# Patient Record
Sex: MALE | Race: WHITE | NOT HISPANIC OR LATINO | Employment: FULL TIME | URBAN - NONMETROPOLITAN AREA
[De-identification: names, ages, dates, MRNs, and addresses within clinical notes are randomized per-mention and may not be internally consistent; named-entity substitution may affect disease eponyms.]

---

## 2018-05-01 ENCOUNTER — OFFICE VISIT CONVERTED (OUTPATIENT)
Dept: FAMILY MEDICINE CLINIC | Age: 23
End: 2018-05-01
Attending: NURSE PRACTITIONER

## 2018-06-13 ENCOUNTER — OFFICE VISIT CONVERTED (OUTPATIENT)
Dept: FAMILY MEDICINE CLINIC | Age: 23
End: 2018-06-13
Attending: NURSE PRACTITIONER

## 2019-02-25 ENCOUNTER — HOSPITAL ENCOUNTER (OUTPATIENT)
Dept: OTHER | Facility: HOSPITAL | Age: 24
Discharge: HOME | End: 2019-02-25
Attending: NURSE PRACTITIONER

## 2019-02-25 ENCOUNTER — OFFICE VISIT CONVERTED (OUTPATIENT)
Dept: FAMILY MEDICINE CLINIC | Age: 24
End: 2019-02-25
Attending: NURSE PRACTITIONER

## 2019-02-25 LAB
ALBUMIN SERPL-MCNC: 4.6 G/DL (ref 3.5–5)
ALBUMIN/GLOB SERPL: 1.7 {RATIO} (ref 1.4–2.6)
ALP SERPL-CCNC: 79 U/L (ref 53–128)
ALT SERPL-CCNC: 20 U/L (ref 10–40)
ANION GAP SERPL CALC-SCNC: 16 MMOL/L (ref 8–19)
AST SERPL-CCNC: 19 U/L (ref 15–50)
BILIRUB SERPL-MCNC: 0.43 MG/DL (ref 0.2–1.3)
BUN SERPL-MCNC: 11 MG/DL (ref 5–25)
BUN/CREAT SERPL: 12 {RATIO} (ref 6–20)
CALCIUM SERPL-MCNC: 9.4 MG/DL (ref 8.7–10.4)
CHLORIDE SERPL-SCNC: 104 MMOL/L (ref 99–111)
CONV CO2: 26 MMOL/L (ref 22–32)
CONV TOTAL PROTEIN: 7.3 G/DL (ref 6.3–8.2)
CREAT UR-MCNC: 0.89 MG/DL (ref 0.7–1.2)
ERYTHROCYTE [DISTWIDTH] IN BLOOD BY AUTOMATED COUNT: 11.7 % (ref 11.5–14.5)
GFR SERPLBLD BASED ON 1.73 SQ M-ARVRAT: >60 ML/MIN/{1.73_M2}
GLOBULIN UR ELPH-MCNC: 2.7 G/DL (ref 2–3.5)
GLUCOSE SERPL-MCNC: 95 MG/DL (ref 70–99)
HBA1C MFR BLD: 15.8 G/DL (ref 14–18)
HCT VFR BLD AUTO: 44.9 % (ref 42–52)
MCH RBC QN AUTO: 28.6 PG (ref 27–31)
MCHC RBC AUTO-ENTMCNC: 35.2 G/DL (ref 33–37)
MCV RBC AUTO: 81.2 FL (ref 80–96)
OSMOLALITY SERPL CALC.SUM OF ELEC: 293 MOSM/KG (ref 273–304)
PLATELET # BLD AUTO: 273 10*3/UL (ref 130–400)
PMV BLD AUTO: 9 FL (ref 7.4–10.4)
POTASSIUM SERPL-SCNC: 4.1 MMOL/L (ref 3.5–5.3)
RBC # BLD AUTO: 5.53 10*6/UL (ref 4.7–6.1)
SODIUM SERPL-SCNC: 142 MMOL/L (ref 135–147)
T4 FREE SERPL-MCNC: 1 NG/DL (ref 0.9–1.8)
TSH SERPL-ACNC: 5.18 M[IU]/L (ref 0.27–4.2)
VIT B12 SERPL-MCNC: 346 PG/ML (ref 211–911)
WBC # BLD AUTO: 6.9 10*3/UL (ref 4.8–10.8)

## 2019-02-26 LAB — 25(OH)D3 SERPL-MCNC: 20.9 NG/ML (ref 30–100)

## 2019-02-28 LAB — CONV ANTI MICROSOMAL AB: 7 IU/ML (ref 0–34)

## 2021-05-18 NOTE — PROGRESS NOTES
Rod Garcia 1995     Office/Outpatient Visit    Visit Date: Mon, Feb 25, 2019 02:55 pm    Provider: Lilian Brown N.P. (Assistant: Ava Gusman LPN)    Location: [facility]        Electronically signed by Lilian Brown N.P. on  02/25/2019 03:36:08 PM                             SUBJECTIVE:        CC:     Mr. Garcia is a 24 year old White male.  Preventative exam         HPI:         Health checkup noted.  He does not perform regular testicular self-exams.  He is current with his Td.  He is not current with influenza immunization.      Smoking Status:  Nonsmoker         PHQ-9 Depression Screening: Completed form scanned and in chart; Total Score 12 Alcohol Consumption Screening: Completed form scanned and in chart; Total Score 0         Dx with headache; onset was approximately 2 months ago.  The pain is diffuse with no specific location.  It does not radiate.  He characterizes it as throbbing.  Associated symptoms include runny nose.  He denies altered consciousness, fever or vision disturbance.  The headache is exacerbated with stress.  It is improved with NSAIDs.  In the process of buying a new house. New job started 2 months ago. Going back to old job next month. Working dayshift.      ROS:     CONSTITUTIONAL:  Positive for fatigue.   Negative for fever.      EYES:  Negative for blurred vision and eye drainage.      E/N/T:  Positive for runny nose.   Negative for ear pain or sore throat.      CARDIOVASCULAR:  Negative for chest pain and palpitations.      RESPIRATORY:  Negative for dyspnea and frequent wheezing.      GASTROINTESTINAL:  Negative for abdominal pain and vomiting.      MUSCULOSKELETAL:  Negative for joint stiffness and myalgias.      INTEGUMENTARY:  Negative for rash.      NEUROLOGICAL:  Positive for headaches.   Negative for dizziness.      PSYCHIATRIC:  Positive for feelings of stress.   Negative for depression or suicidal thoughts.          PMH/FMH/SH:     Last Reviewed on  2/25/2019 03:09 PM by Lilian Brown    Past Medical History:             PAST MEDICAL HISTORY         Asthma: dx'd at age 3; moderate severity;     ADHD (early childhood) per pt         Surgical History:     NONE         Family History:         Positive for Coronary Artery Disease ( paternal side ) and Hypertension ( paternal side ).      Positive for Asthma ( brother ).      Positive for Type 2 Diabetes ( paternal side ).  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  Non-drinker         Substance Abuse History:     Last Reviewed on 2/25/2019 03:09 PM by Lilian Brown    None         Mental Health History:     Last Reviewed on 2/25/2019 03:09 PM by Lilian Brown    NEGATIVE         Communicable Diseases (eg STDs):     Last Reviewed on 2/25/2019 03:09 PM by Lilian Brown    Reportable health conditions; NEGATIVE             Current Problems:     Last Reviewed on 2/25/2019 03:09 PM by Lilian Brown    Asthma     Acne     Screening for depression         Immunizations:     Adacel (Tdap) 1/31/2013         Allergies:     Last Reviewed on 2/25/2019 03:09 PM by Lilian Brown      No Known Drug Allergies.         Current Medications:     Last Reviewed on 2/25/2019 03:09 PM by Lilian Brown    Ibuprofen 200mg Capsules 1-2 PRN     Tylenol Caplets     Zyrtec 10mg Tablet 1 tab daily         OBJECTIVE:        Vitals:         Historical:     06/13/2018  BP:   119/81 mm Hg ( (left arm, , sitting, );)         Current: 2/25/2019 3:00:57 PM    Ht:  5 ft, 10.5 in;  Wt: 227.8 lbs;  BMI: 32.2    T: 98.4 F (oral);  BP: 135/78 mm Hg (left arm, sitting);  P: 82 bpm (left arm (BP Cuff), sitting)        Exams:     PHYSICAL EXAM:     GENERAL: well developed,  well nourished;  no apparent distress;     EYES: PERRL, EOMI     E/N/T: EARS: external auditory canal normal;  bilateral TMs are normal;  NOSE: normal turbinates; no sinus tenderness; OROPHARYNX: oral mucosa is normal; posterior pharynx shows no exudate;     NECK: range of motion is normal; trachea is midline;     RESPIRATORY: normal respiratory rate and pattern with no distress; normal breath sounds with no rales, rhonchi, wheezes or rubs;     CARDIOVASCULAR: normal rate; rhythm is regular;     GASTROINTESTINAL: nontender; normal bowel sounds; no organomegaly;     SKIN:  no significant rashes or lesions; no suspicious moles;     MUSCULOSKELETAL: normal gait; normal range of motion of all major muscle groups; no limb or joint pain with range of motion;     NEUROLOGIC: mental status: alert and oriented x 3; GROSSLY INTACT     PSYCHIATRIC: appropriate affect and demeanor; normal psychomotor function; normal speech pattern; normal thought and perception;         ASSESSMENT           V70.0   Z00.00  Health checkup              DDx:     V79.0   Z13.89  Screening for depression              DDx:     493.00   J45.20  Asthma              DDx:     784.0   R51  Headache              DDx:     780.79   R53.83  Fatigue              DDx:         ORDERS:         Meds Prescribed:       ProAir HFA (Albuterol) 90mcg/1actuation Oral Inhaler Inhale 2 puff(s) by mouth q 4 to 6 hr prn.  #16 (Sixteen) gm Refills: 1         Lab Orders:       58440  BDCB2 - [facility] CBC w/o diff  (Send-Out)         00923  COMP - [facility] Comp. Metabolic Panel  (Send-Out)         49735  TSH - [facility] TSH  (Send-Out)         03741  VB12 - [facility] Vitamin B12  (Send-Out)         79208  VITD - [facility] Vitamin D, 25 Hydroxy  (Send-Out)           Other Orders:         Calculated BMI above the upper parameter and a follow-up plan was documented in the medical record  (In-House)           Depression screen negative  (In-House)           Negative EtOH screen  (In-House)                    PLAN:          Health checkup     LABORATORY:  Labs ordered to be performed today include CBC W/O DIFF, Comprehensive metabolic panel, and TSH.      RECOMMENDATIONS given include: Further recommendation to be given after test results are complete.  [facility] Vaccines Flu and Pneumonia updated in Shot record     BMI Elevated - Follow-Up Plan: He was provided education on weight loss strategies     FOLLOW-UP: Schedule follow-up appointments on a p.r.n. basis. for Annual Checkup     COUNSELING was provided today regarding the following topics: healthy eating habits, regular exercise, testicular self-exam, alcohol, and ADVISED TO SEE AN EYE DOCTOR AND A DENTIST REGULARLY.            Orders:       07566  BDCB2 - [facility] CBC w/o diff  (Send-Out)         39152  COMP - [facility] Comp. Metabolic Panel  (Send-Out)         26396  TSH - [facility] TSH  (Send-Out)           Calculated BMI above the upper parameter and a follow-up plan was documented in the medical record  (In-House)             Patient Education Handouts:       Physical Exam 20-29 year, Male           Screening for depression     [facility] PHQ-9 Depression Screening Completed form scanned and in chart; Total Score 12 Negative alcohol screen           Orders:         Depression screen negative  (In-House)           Negative EtOH screen  (In-House)            Asthma Will refill Albuterol inhaler in the event that he needs. Currently stable.           Prescriptions:       ProAir HFA (Albuterol) 90mcg/1actuation Oral Inhaler Inhale 2 puff(s) by mouth q 4 to 6 hr prn.  #16 (Sixteen) gm Refills: 1          Headache Discussed with patient that I feel this is likely tension/stress headache. Advised Ibuprofen/Tylenol per package instructions. Sleep hygiene. Headache diary.          Fatigue     LABORATORY:  Labs ordered to be performed today include B12 and Vitamin D.      RECOMMENDATIONS given include: Further recommendation to be given after test results are  complete.            Orders:       48845  VB12 - [facility] Vitamin B12  (Send-Out)         29923  VITD - [facility] Vitamin D, 25 Hydroxy  (Send-Out)               Patient Recommendations:        For  Health checkup:     Limit dietary intake of fat (especially saturated fat) and cholesterol.  Eat a variety of foods, including plenty of fruits, vegetables, and grain containg fiber, limit fat intake to 30% of total calories. Balance caloric intake with energy expended. Maintaining regular physical activity is advised to help prevent heart disease, hypertension, diabetes, and obesity.    Testicular cancer is the #1 cancer in men ages 15-35.  You should regularly examine your testicles for knots, lumps, or tenderness. Testicular pain, even if not associated with any masses, needs to be evaluated by your doctor!  Schedule follow-up appointments as needed.              CHARGE CAPTURE           **Please note: ICD descriptions below are intended for billing purposes only and may not represent clinical diagnoses**        Primary Diagnosis:         V70.0 Health checkup            Z00.00    Encounter for general adult medical examination without abnormal findings              Orders:          92711   Preventive medicine, established patient, age 18-39 years  (In-House)                Calculated BMI above the upper parameter and a follow-up plan was documented in the medical record  (In-House)           V79.0 Screening for depression            Z13.89    Encounter for screening for other disorder              Orders:             Depression screen negative  (In-House)                Negative EtOH screen  (In-House)           493.00 Asthma            J45.20    Mild intermittent asthma, uncomplicated    784.0 Headache            R51    Headache    780.79 Fatigue            R53.83    Other fatigue

## 2021-05-18 NOTE — PROGRESS NOTES
Rod Garcia 1995     Office/Outpatient Visit    Visit Date: Wed, Jun 13, 2018 11:42 am    Provider: Regina Santos N.P. (Assistant: Katy Brothers RN)    Location: [facility]        Electronically signed by Regina Santos N.P. on  06/13/2018 12:44:49 PM                             SUBJECTIVE:        CC:     Mr. Garcia is a 23 year old White male.  Rash to right arm         HPI:         Patient complains of rash.  This has been a problem for the past month.  The rash has not occurred previously.  It is located primarily on the right arm.  He describes the rash as red.  The rash is moderately pruritic.      ROS:     CONSTITUTIONAL:  Negative for fever.      CARDIOVASCULAR:  Negative for chest pain, palpitations, tachycardia, orthopnea, and edema.      RESPIRATORY:  Negative for cough, dyspnea, and hemoptysis.      NEUROLOGICAL:  Negative for dizziness, headaches, paresthesias, and weakness.          PMH/FM/SH:     Last Reviewed on 6/13/2018 11:47 AM by Regina Santos    Past Medical History:             PAST MEDICAL HISTORY         Asthma: dx'd at age 3; moderate severity;         Surgical History:     NONE         Family History:         Positive for Coronary Artery Disease ( paternal side ) and Hypertension ( paternal side ).      Positive for Asthma ( brother ).      Positive for Type 2 Diabetes ( paternal side ).  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  Non-drinker             Immunizations:     Adacel (Tdap) 1/31/2013         Allergies:     Last Reviewed on 6/13/2018 11:44 AM by Katy Brothers      No Known Drug Allergies.         Current Medications:     Last Reviewed on 6/13/2018  11:44 AM by Katy Brothers    Ibuprofen 200mg Capsules 1-2 PRN     Tylenol Caplets     Zyrtec 10mg Tablet 1 tab daily         OBJECTIVE:        Vitals:         Current: 6/13/2018 11:43:50 AM    Ht:  5 ft, 10.5 in;  Wt: 231.2 lbs;  BMI: 32.7    T: 98.3 F (oral);  BP: 119/81 mm Hg (left arm, sitting);  P: 64 bpm (left arm (BP Cuff), sitting)        Exams:     PHYSICAL EXAM:     GENERAL:  well developed and nourished; appropriately groomed; in no apparent distress;     RESPIRATORY: normal respiratory rate and pattern with no distress; normal breath sounds with no rales, rhonchi, wheezes or rubs;     CARDIOVASCULAR: normal rate; rhythm is regular;  no systolic murmur;     SKIN: a rash is noted on the right wrist;  the color is mainly pink and red;  it is best characterized as 3-4 round erythematous lesions with central clearing on right forearm;     PSYCHIATRIC:  appropriate affect and demeanor; normal speech pattern; grossly normal memory;         ASSESSMENT           782.1   R21  Rash              DDx:         ORDERS:         Meds Prescribed:       Lamisil AT (Terbinafine HCl) 1% Cream Apply small amount to affected area bid  #24 (Twenty Four) gm Refills: 1                 PLAN:          Rash discussed fungal rashes, keep skin dry, consider derm referral         FOLLOW-UP: Advised to call if there is no improvement in 2 week(s).            Prescriptions:       Lamisil AT (Terbinafine HCl) 1% Cream Apply small amount to affected area bid  #24 (Twenty Four) gm Refills: 1             CHARGE CAPTURE           **Please note: ICD descriptions below are intended for billing purposes only and may not represent clinical diagnoses**        Primary Diagnosis:         782.1 Rash            R21    Rash and other nonspecific skin eruption              Orders:          29401   Office/outpatient visit; established patient, level 3  (In-House)

## 2021-05-18 NOTE — PROGRESS NOTES
Rod Garcia 1995     Office/Outpatient Visit    Visit Date: Tue, May 1, 2018 03:39 pm    Provider: Rach Giordano N.P. (Assistant: Edilia Clayton)    Location: [facility]        Electronically signed by Rach Giordano N.P. on  05/01/2018 05:36:31 PM                             SUBJECTIVE:        CC:     Mr. Garcia is a 23 year old White male.  earache right ear         HPI:         Patient complains of ear pain.      Mr. Garcia complains of right ear pain.  Associated symptoms include diminished hearing and drainage from the right ear.  The symptoms began 2 days ago.  Pertinent medical history is unremarkable.  He has not tried any medications for symptomatic relief.      ROS:     CONSTITUTIONAL:  Negative for chills, fatigue, fever and weight change.      E/N/T:  Positive for ear pain ( right ).      CARDIOVASCULAR:  Negative for chest pain, orthopnea, paroxysmal nocturnal dyspnea and pedal edema.      RESPIRATORY:  Negative for dyspnea and cough.          PMH/[facility]/SH:     Last Reviewed on 5/01/2018 05:35 PM by Rach Giordano    Past Medical History:         Asthma: dx'd at age 3; moderate severity;         Surgical History:     NONE         Family History:         Positive for Coronary Artery Disease ( paternal side ) and Hypertension ( paternal side ).      Positive for Asthma ( brother ).      Positive for Type 2 Diabetes ( paternal side ).  Father: Hypertension     Mother: Hypertension     Brother(s): 3 brother(s) total;  Asthma         Social History:     Occupation: [employer]     Marital Status: Single     Children: None         Tobacco/Alcohol/Supplements:     Last Reviewed on 5/01/2018 05:35 PM by Rach Giordano    Tobacco: He has never smoked.  Non-drinker             Current Problems:     Last Reviewed on 5/01/2018 05:35 PM by Rach Giordano    Acne     Ear pain         Immunizations:     Adacel (Tdap) 1/31/2013         Allergies:     Last Reviewed on  5/01/2018 05:35 PM by Rach Giordano      No Known Drug Allergies.         Current Medications:     Last Reviewed on 5/01/2018 05:35 PM by Rach Giordano    Ibuprofen 200mg Capsules 1-2 PRN     Tylenol Caplets     Zyrtec 10mg Tablet 1 tab daily         OBJECTIVE:        Vitals:         Current: 5/1/2018 3:42:11 PM    Ht:  5 ft, 10.5 in;  Wt: 231.1 lbs;  BMI: 32.7    T: 98.7 F (oral);  BP: 147/89 mm Hg (left arm, sitting);  P: 88 bpm (left arm (BP Cuff), sitting)        Exams:     PHYSICAL EXAM:     GENERAL: Vitals recorded well developed, well nourished;  well groomed;  no apparent distress;     E/N/T: EARS: the right TM is dull and red;     RESPIRATORY: normal respiratory rate and pattern with no distress; normal breath sounds with no rales, rhonchi, wheezes or rubs;     CARDIOVASCULAR: normal rate; rhythm is regular;  normal S1; normal S2; no systolic murmur; no cyanosis; no edema;         ASSESSMENT:           388.70   H65.01  Ear pain              DDx:         ORDERS:         Meds Prescribed:       Amoxicillin 875mg Tablet One PO BID X 10 days.  #20 (Twenty) tablet(s) Refills: 0                 PLAN:          Ear pain         FOLLOW-UP: Advised to call if there is no improvement Follow-up by phone if no improvement in 1 week..   Schedule follow-up appointments on a p.r.n. basis..            Prescriptions:       Amoxicillin 875mg Tablet One PO BID X 10 days.  #20 (Twenty) tablet(s) Refills: 0           Patient Education Handouts:       Acute Ear Infection (Otitis Media)              Patient Recommendations:        For  Ear pain:     Follow-up by phone if no improvement in 1 week. Schedule follow-up appointments as needed.                APPOINTMENT INFORMATION:        Monday Tuesday Wednesday Thursday Friday Saturday Sunday            Time:___________________AM  PM   Date:_____________________             CHARGE CAPTURE:           Primary Diagnosis:     388.70 Ear pain            H65.01    Acute  serous otitis media, right ear              Orders:          97610   Office/outpatient visit; established patient, level 3  (In-House)

## 2021-07-01 VITALS
WEIGHT: 231.2 LBS | HEART RATE: 64 BPM | DIASTOLIC BLOOD PRESSURE: 81 MMHG | BODY MASS INDEX: 32.37 KG/M2 | HEIGHT: 71 IN | SYSTOLIC BLOOD PRESSURE: 119 MMHG | TEMPERATURE: 98.3 F

## 2021-07-01 VITALS
DIASTOLIC BLOOD PRESSURE: 89 MMHG | SYSTOLIC BLOOD PRESSURE: 147 MMHG | BODY MASS INDEX: 32.35 KG/M2 | TEMPERATURE: 98.7 F | HEART RATE: 88 BPM | HEIGHT: 71 IN | WEIGHT: 231.1 LBS

## 2021-07-01 VITALS
SYSTOLIC BLOOD PRESSURE: 135 MMHG | DIASTOLIC BLOOD PRESSURE: 78 MMHG | WEIGHT: 227.8 LBS | HEART RATE: 82 BPM | HEIGHT: 71 IN | BODY MASS INDEX: 31.89 KG/M2 | TEMPERATURE: 98.4 F

## 2022-08-03 ENCOUNTER — OFFICE VISIT (OUTPATIENT)
Dept: FAMILY MEDICINE CLINIC | Age: 27
End: 2022-08-03

## 2022-08-03 ENCOUNTER — LAB (OUTPATIENT)
Dept: LAB | Facility: HOSPITAL | Age: 27
End: 2022-08-03

## 2022-08-03 VITALS
HEART RATE: 70 BPM | WEIGHT: 210.8 LBS | BODY MASS INDEX: 29.51 KG/M2 | DIASTOLIC BLOOD PRESSURE: 85 MMHG | SYSTOLIC BLOOD PRESSURE: 126 MMHG | HEIGHT: 71 IN

## 2022-08-03 DIAGNOSIS — N52.8 OTHER MALE ERECTILE DYSFUNCTION: ICD-10-CM

## 2022-08-03 DIAGNOSIS — Z72.51 HIGH RISK HETEROSEXUAL BEHAVIOR: Primary | ICD-10-CM

## 2022-08-03 LAB
HAV IGM SERPL QL IA: NORMAL
HBV CORE IGM SERPL QL IA: NORMAL
HBV SURFACE AG SERPL QL IA: NORMAL
HCV AB SER DONR QL: NORMAL
HIV1+2 AB SER QL: NORMAL

## 2022-08-03 PROCEDURE — 99203 OFFICE O/P NEW LOW 30 MIN: CPT | Performed by: FAMILY MEDICINE

## 2022-08-03 PROCEDURE — 86592 SYPHILIS TEST NON-TREP QUAL: CPT | Performed by: FAMILY MEDICINE

## 2022-08-03 PROCEDURE — 36415 COLL VENOUS BLD VENIPUNCTURE: CPT | Performed by: FAMILY MEDICINE

## 2022-08-03 PROCEDURE — G0432 EIA HIV-1/HIV-2 SCREEN: HCPCS | Performed by: FAMILY MEDICINE

## 2022-08-03 PROCEDURE — 80074 ACUTE HEPATITIS PANEL: CPT | Performed by: FAMILY MEDICINE

## 2022-08-03 RX ORDER — SILDENAFIL CITRATE 20 MG/1
20 TABLET ORAL DAILY PRN
COMMUNITY
End: 2023-02-03

## 2022-08-03 NOTE — ASSESSMENT & PLAN NOTE
DISCUSSED THE DETAILS OF NORMAL ERECTILE FUNCTION AND PERFORMANCE ANXIETY ISSUES.  FOR NOW HE WILL CONTINUE THE PRN GENERIC VIAGRA BUT WOULD CONSIDER DIFFERENT MEDS, LAB EVAL AND POSSIBLE UROLOGY REFERRAL

## 2022-08-03 NOTE — PROGRESS NOTES
"Chief Complaint  Establish Care    Subjective          Rod Garcia presents to [facility]  NEW PATIENT:  --HE EXPERIENCED AN EPISODE OF E.C. FOUR MONTHS AGO AND SINCE THAT TIME HAS HAD CONTINUED ISSUES MAINTAINING AN ERECTION FOR SATISFACTORY INTERCOURSE.  HE GOT SOME GENERIC VIAGRA ON LINE WHICH SEEMS TO HELP BUT HE WANTED SOME MORE ADVICE.    --HE WOULD ALSO LIKE TO BE TESTED FOR STD'S ALTHOUGH HE IS NOT AWARE OF ANY PARTICULAR EXPOSURE AND IS HAVING NO SYMPTOMS.  HE STATES THAT HE ALWAYS USES A CONDOM         No Known Allergies     Health Maintenance Due   Topic Date Due   • ANNUAL PHYSICAL  Never done   • HEPATITIS C SCREENING  Never done        Current Outpatient Medications on File Prior to Visit   Medication Sig   • sildenafil (REVATIO) 20 MG tablet Take 20 mg by mouth Daily As Needed for Erectile Dysfunction. 1-2 AS NEEDED     No current facility-administered medications on file prior to visit.       Immunization History   Administered Date(s) Administered   • Tdap 01/31/2013       Review of Systems   Constitutional: Negative for activity change, appetite change, chills, fatigue and fever.   HENT: Negative for congestion, ear pain, rhinorrhea and sore throat.    Respiratory: Negative for cough and shortness of breath.    Cardiovascular: Negative for chest pain, palpitations and leg swelling.   Gastrointestinal: Negative for abdominal pain, constipation, diarrhea, nausea and vomiting.   Genitourinary: Negative for decreased libido, difficulty urinating, discharge, dysuria, frequency, genital sores, hematuria, penile pain and urgency.   Musculoskeletal: Negative for arthralgias and myalgias.   Neurological: Negative for headache.   Psychiatric/Behavioral: Positive for stress.        Objective     /85 (BP Location: Left arm, Patient Position: Sitting)   Pulse 70   Ht 179.1 cm (70.5\")   Wt 95.6 kg (210 lb 12.8 oz)   BMI 29.82 kg/m²       Physical Exam  Vitals and " nursing note reviewed.   Constitutional:       General: He is not in acute distress.     Appearance: Normal appearance.   Cardiovascular:      Rate and Rhythm: Normal rate and regular rhythm.      Heart sounds: Normal heart sounds. No murmur heard.  Pulmonary:      Effort: Pulmonary effort is normal.      Breath sounds: Normal breath sounds.   Abdominal:      Palpations: Abdomen is soft.      Tenderness: There is no abdominal tenderness.   Musculoskeletal:      Cervical back: Neck supple.      Right lower leg: No edema.      Left lower leg: No edema.   Lymphadenopathy:      Cervical: No cervical adenopathy.   Neurological:      General: No focal deficit present.      Mental Status: He is alert.      Cranial Nerves: No cranial nerve deficit.      Coordination: Coordination normal.      Gait: Gait normal.   Psychiatric:         Mood and Affect: Mood normal.         Behavior: Behavior normal.         Result Review :                             Assessment and Plan      Diagnoses and all orders for this visit:    1. High risk heterosexual behavior (Primary)  -     Chlamydia trachomatis, Neisseria gonorrhoeae, PCR - , Urine, Clean Catch  -     Hepatitis panel, acute  -     RPR  -     HIV-1/O/2 Ag/Ab w Reflex    2. Other male erectile dysfunction  Assessment & Plan:  DISCUSSED THE DETAILS OF NORMAL ERECTILE FUNCTION AND PERFORMANCE ANXIETY ISSUES.  FOR NOW HE WILL CONTINUE THE PRN GENERIC VIAGRA BUT WOULD CONSIDER DIFFERENT MEDS, LAB EVAL AND POSSIBLE UROLOGY REFERRAL              Follow Up     Return if symptoms worsen or fail to improve.    Patient was given instructions and counseling regarding his condition or for health maintenance advice. Please see specific information pulled into the AVS if appropriate.

## 2022-08-04 LAB — RPR SER QL: NORMAL

## 2022-08-23 ENCOUNTER — TELEPHONE (OUTPATIENT)
Dept: FAMILY MEDICINE CLINIC | Age: 27
End: 2022-08-23

## 2022-08-23 NOTE — TELEPHONE ENCOUNTER
----- Message from Hazel Granado LPN sent at 8/8/2022 10:32 AM EDT -----      ----- Message -----  From: SYSTEM  Sent: 8/8/2022   1:44 AM EDT  To: [facility] [place] [facility] [place]

## 2022-12-15 ENCOUNTER — TELEPHONE (OUTPATIENT)
Dept: FAMILY MEDICINE CLINIC | Age: 27
End: 2022-12-15

## 2022-12-15 NOTE — TELEPHONE ENCOUNTER
Caller: Rod Garcia    Relationship: Self    Best call back number: [phone]    What is the best time to reach you: ANYTIME    Who are you requesting to speak with (clinical staff, provider,  specific staff member): RENEE    What was the call regarding: PATIENT WOULD LIKE A CALL FROM RENEE TO DISCUSS SOMETHING PERSONAL.     Do you require a callback: YES

## 2022-12-19 NOTE — TELEPHONE ENCOUNTER
Pt said its personal and would not discuss. Offered two appts this month. He said no he can not make it, schedule him one after the first of the year. Appt scheduled for 02/03/23.

## 2023-02-03 ENCOUNTER — OFFICE VISIT (OUTPATIENT)
Dept: FAMILY MEDICINE CLINIC | Age: 28
End: 2023-02-03
Payer: COMMERCIAL

## 2023-02-03 VITALS
OXYGEN SATURATION: 98 % | DIASTOLIC BLOOD PRESSURE: 83 MMHG | SYSTOLIC BLOOD PRESSURE: 150 MMHG | TEMPERATURE: 98.1 F | BODY MASS INDEX: 29.6 KG/M2 | HEART RATE: 87 BPM | HEIGHT: 71 IN | WEIGHT: 211.4 LBS

## 2023-02-03 DIAGNOSIS — Z20.2 EXPOSURE TO GENITAL HERPES: Primary | ICD-10-CM

## 2023-02-03 PROCEDURE — 99213 OFFICE O/P EST LOW 20 MIN: CPT | Performed by: FAMILY MEDICINE

## 2023-02-03 NOTE — PROGRESS NOTES
"Chief Complaint  Exposure to STD (Pt states he would like lab orders to be tested for STD's. Pt is not having symptoms just wants to be checked. 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  HE IS USING CONDOMS AND HAS HAD NO SYMPTOMS AS WELL.  IS CONCERNED REGARDING WETHER OR NOT HE SHOULD HAVE LABS DONE.          No Known Allergies     Health Maintenance Due   Topic Date Due   • COVID-19 Vaccine (1) Never done   • INFLUENZA VACCINE  Never done   • ANNUAL PHYSICAL  Never done   • TDAP/TD VACCINES (2 - Td or Tdap) 01/31/2023        Current Outpatient Medications on File Prior to Visit   Medication Sig   • [DISCONTINUED] sildenafil (REVATIO) 20 MG tablet Take 20 mg by mouth Daily As Needed for Erectile Dysfunction. 1-2 AS NEEDED     No current facility-administered medications on file prior to visit.       Immunization History   Administered Date(s) Administered   • Tdap 01/31/2013       Review of Systems   Constitutional: Negative for activity change, appetite change, chills, fatigue and fever.   HENT: Negative for congestion, ear pain, rhinorrhea and sore throat.    Respiratory: Negative for cough and shortness of breath.    Cardiovascular: Negative for chest pain, palpitations and leg swelling.   Gastrointestinal: Negative for abdominal pain, constipation, diarrhea, nausea and vomiting.   Musculoskeletal: Negative for arthralgias and myalgias.   Neurological: Negative for headache.        Objective     /83 (BP Location: Left arm, Patient Position: Sitting, Cuff Size: Adult)   Pulse 87   Temp 98.1 °F (36.7 °C) (Oral)   Ht 179.1 cm (70.51\")   Wt 95.9 kg (211 lb 6.4 oz)   SpO2 98% Comment: room air  BMI 29.89 kg/m²       Physical Exam  Vitals and nursing note reviewed. "   Constitutional:       General: He is not in acute distress.     Appearance: Normal appearance.   Pulmonary:      Effort: Pulmonary effort is normal.   Musculoskeletal:      Right lower leg: No edema.      Left lower leg: No edema.   Neurological:      General: No focal deficit present.      Mental Status: He is alert.      Cranial Nerves: No cranial nerve deficit.      Coordination: Coordination normal.      Gait: Gait normal.   Psychiatric:         Mood and Affect: Mood normal.         Behavior: Behavior normal.         Result Review :                             Assessment and Plan      Diagnoses and all orders for this visit:    1. Exposure to genital herpes (Primary)  Comments:  DISCUSSED THIS VIRAL INFECTION.  AS HIS PARTNER WAS EXPOSED AND HAS HAD NOT BREAKOUTS HE PREFERS TO WAIT ON TESTING AT THIS TIME.  ADVISED TO CONTINUE CONDOMS            Follow Up     Return if symptoms worsen or fail to improve.    Patient was given instructions and counseling regarding his condition or for health maintenance advice. Please see specific information pulled into the AVS if appropriate.